# Patient Record
Sex: FEMALE | Race: WHITE | NOT HISPANIC OR LATINO
[De-identification: names, ages, dates, MRNs, and addresses within clinical notes are randomized per-mention and may not be internally consistent; named-entity substitution may affect disease eponyms.]

---

## 2019-03-22 ENCOUNTER — RESULT REVIEW (OUTPATIENT)
Age: 34
End: 2019-03-22

## 2019-03-23 ENCOUNTER — INPATIENT (INPATIENT)
Facility: HOSPITAL | Age: 34
LOS: 0 days | Discharge: HOME | DRG: 817 | End: 2019-03-24
Attending: OBSTETRICS & GYNECOLOGY | Admitting: OBSTETRICS & GYNECOLOGY
Payer: COMMERCIAL

## 2019-03-23 VITALS
HEART RATE: 108 BPM | WEIGHT: 136.03 LBS | OXYGEN SATURATION: 98 % | SYSTOLIC BLOOD PRESSURE: 138 MMHG | TEMPERATURE: 98 F | DIASTOLIC BLOOD PRESSURE: 85 MMHG | RESPIRATION RATE: 19 BRPM | HEIGHT: 63 IN

## 2019-03-23 LAB
ANION GAP SERPL CALC-SCNC: 12 MMOL/L — SIGNIFICANT CHANGE UP (ref 5–17)
APTT BLD: 33.1 SEC — SIGNIFICANT CHANGE UP (ref 27.5–36.3)
BASOPHILS # BLD AUTO: 0.02 K/UL — SIGNIFICANT CHANGE UP (ref 0–0.2)
BASOPHILS NFR BLD AUTO: 0.2 % — SIGNIFICANT CHANGE UP (ref 0–2)
BLD GP AB SCN SERPL QL: NEGATIVE — SIGNIFICANT CHANGE UP
BUN SERPL-MCNC: 10 MG/DL — SIGNIFICANT CHANGE UP (ref 7–23)
CALCIUM SERPL-MCNC: 10 MG/DL — SIGNIFICANT CHANGE UP (ref 8.4–10.5)
CHLORIDE SERPL-SCNC: 104 MMOL/L — SIGNIFICANT CHANGE UP (ref 96–108)
CO2 SERPL-SCNC: 24 MMOL/L — SIGNIFICANT CHANGE UP (ref 22–31)
CREAT SERPL-MCNC: 0.38 MG/DL — LOW (ref 0.5–1.3)
EOSINOPHIL # BLD AUTO: 0.02 K/UL — SIGNIFICANT CHANGE UP (ref 0–0.5)
EOSINOPHIL NFR BLD AUTO: 0.2 % — SIGNIFICANT CHANGE UP (ref 0–6)
GLUCOSE SERPL-MCNC: 208 MG/DL — HIGH (ref 70–99)
HCT VFR BLD CALC: 40.9 % — SIGNIFICANT CHANGE UP (ref 34.5–45)
HGB BLD-MCNC: 14.2 G/DL — SIGNIFICANT CHANGE UP (ref 11.5–15.5)
IMM GRANULOCYTES NFR BLD AUTO: 0.3 % — SIGNIFICANT CHANGE UP (ref 0–1.5)
INR BLD: 1.05 — SIGNIFICANT CHANGE UP (ref 0.88–1.16)
LYMPHOCYTES # BLD AUTO: 1.24 K/UL — SIGNIFICANT CHANGE UP (ref 1–3.3)
LYMPHOCYTES # BLD AUTO: 9.5 % — LOW (ref 13–44)
MCHC RBC-ENTMCNC: 28.1 PG — SIGNIFICANT CHANGE UP (ref 27–34)
MCHC RBC-ENTMCNC: 34.7 GM/DL — SIGNIFICANT CHANGE UP (ref 32–36)
MCV RBC AUTO: 81 FL — SIGNIFICANT CHANGE UP (ref 80–100)
MONOCYTES # BLD AUTO: 0.36 K/UL — SIGNIFICANT CHANGE UP (ref 0–0.9)
MONOCYTES NFR BLD AUTO: 2.8 % — SIGNIFICANT CHANGE UP (ref 2–14)
NEUTROPHILS # BLD AUTO: 11.38 K/UL — HIGH (ref 1.8–7.4)
NEUTROPHILS NFR BLD AUTO: 87 % — HIGH (ref 43–77)
NRBC # BLD: 0 /100 WBCS — SIGNIFICANT CHANGE UP (ref 0–0)
PLATELET # BLD AUTO: 252 K/UL — SIGNIFICANT CHANGE UP (ref 150–400)
POTASSIUM SERPL-MCNC: 3.8 MMOL/L — SIGNIFICANT CHANGE UP (ref 3.5–5.3)
POTASSIUM SERPL-SCNC: 3.8 MMOL/L — SIGNIFICANT CHANGE UP (ref 3.5–5.3)
PROTHROM AB SERPL-ACNC: 11.9 SEC — SIGNIFICANT CHANGE UP (ref 10–12.9)
RBC # BLD: 5.05 M/UL — SIGNIFICANT CHANGE UP (ref 3.8–5.2)
RBC # FLD: 12.5 % — SIGNIFICANT CHANGE UP (ref 10.3–14.5)
RH IG SCN BLD-IMP: POSITIVE — SIGNIFICANT CHANGE UP
SODIUM SERPL-SCNC: 140 MMOL/L — SIGNIFICANT CHANGE UP (ref 135–145)
WBC # BLD: 13.06 K/UL — HIGH (ref 3.8–10.5)
WBC # FLD AUTO: 13.06 K/UL — HIGH (ref 3.8–10.5)

## 2019-03-23 PROCEDURE — 99285 EMERGENCY DEPT VISIT HI MDM: CPT

## 2019-03-23 RX ORDER — BUPIVACAINE 13.3 MG/ML
20 INJECTION, SUSPENSION, LIPOSOMAL INFILTRATION ONCE
Qty: 0 | Refills: 0 | Status: DISCONTINUED | OUTPATIENT
Start: 2019-03-23 | End: 2019-03-24

## 2019-03-23 NOTE — ED PROVIDER NOTE - CLINICAL SUMMARY MEDICAL DECISION MAKING FREE TEXT BOX
Impression: known ectopic pregnancy tx'd w/ methotrexate, with r sided abd pain today, concerning for impending rupture. Afebrile. HDS. Pt seen by gyn resident and attending in ed. Pt to be taken to OR tonight.

## 2019-03-23 NOTE — ED ADULT NURSE NOTE - OTHER COMPLAINTS
CC of medical evaluation, sent by the [facility] (ob-gyne), known ectopic pregnancy R fallopian tube, ~ 6 weeks AOG, . had a methotrexate shot 2019. had a sonogram this AM and to be seen by the OB. pain is lesser compared this AM

## 2019-03-23 NOTE — H&P ADULT - HISTORY OF PRESENT ILLNESS
34 yo G1 at 6 weeks presents for evaluation of new onset abdominal pain in the setting of a known right tubal ectopic pregnancy. Patient reports that she received IM Methotrexate on 3/18 for this tubal pregnancy. Today, the patient developed abdominal pain in the afternoon that she initially attributed to gas pains but which did not resolve with rest and PO hydration. Patient called her doctor and she was told to come to the ED for further evaluation. Patient denies fever, chills, chest pain, SOB,  nausea, vomiting, and vaginal bleeding.       OBHx: G1- current; s/p Methotrexate on 3/18 for right tubal ectopic   GYN Hx: denies history of STIs, fibroids; known left dermoid cyst - approximately 3-4 cm   PMHx: Asthma - never hospitalized or intubated   SHx: mole removal   Meds: denies   Allergies: Cats; NKDA

## 2019-03-23 NOTE — H&P ADULT - ASSESSMENT
32 yo G1 at 6 weeks presents with new onset abdominal pain in the setting of a known right tubal ectopic.   - Admit for diagnostic laparoscopy   - NPO  - IV fluids  - CBC, serum bHCG and type and screen sent       Patient evaluated with Dr. Hightower

## 2019-03-23 NOTE — ED PROVIDER NOTE - PHYSICAL EXAMINATION
VITAL SIGNS: I have reviewed nursing notes and confirm.  CONSTITUTIONAL: Well-developed; well-nourished; in no acute distress.   SKIN:  warm and dry, no acute rash.   HEAD:  normocephalic, atraumatic.  EYES: PERRL, EOM intact; conjunctiva and sclera clear.  ENT: No nasal discharge; airway clear.   NECK: Supple; non tender.  CARD: S1, S2 normal; no murmurs, gallops, or rubs. Regular rate and rhythm.   RESP:  Clear to auscultation b/l, no wheezes, rales or rhonchi.  ABD: Normal bowel sounds; soft; non-distended; mild tenderness to r abd/ periumbilical region; no guarding/ rebound.  PELVIC: deferred to gyn.  EXT: Normal ROM. No clubbing, cyanosis or edema. 2+ pulses to b/l ue/le.  NEURO: Alert, oriented, grossly unremarkable  PSYCH: Cooperative, mood and affect appropriate.

## 2019-03-23 NOTE — ED PROVIDER NOTE - CARE PLAN
Principal Discharge DX:	Ectopic pregnancy, unspecified location, unspecified whether intrauterine pregnancy present  Secondary Diagnosis:	Cyst of ovary, unspecified laterality

## 2019-03-23 NOTE — ED ADULT NURSE NOTE - OBJECTIVE STATEMENT
PT came to ED for medical evaluation, sent by the [facility] (ob-[facility]), known ectopic pregnancy R fallopian tube, ~ 6 weeks AOG, . had a methotrexate shot 2019. had a sonogram this AM and to be seen by the OB. pain is lesser compared this AM.  PT reports spotting, denies chest pain, SOB, fever, chills, D/N/V. Pt is alert and oriented x3. ambulatory with even gait. PT came to ED for medical evaluation, sent by the [facility] ([facility]), known ectopic pregnancy R fallopian tube, ~ 6 weeks AOG, . had a methotrexate shot 2019. had a sonogram this AM and to be seen by the OB. pain is lesser compared this AM.  PT reports spotting and bleeding for last several weeks, reports nausea and vomiting today. Denies chest pain, SOB, fever, chills, dizziness. Pt is alert and oriented x3. ambulatory with even gait.

## 2019-03-23 NOTE — ED ADULT TRIAGE NOTE - OTHER COMPLAINTS
CC of medical evaluation, sent by the [facility] ([facility]), known ectopic pregnancy R fallopian tube, had a methotrexate shot 18 March 2019. had a sonogram this AM and to be seen by the OB. pain is lesser compared this AM CC of medical evaluation, sent by the [facility] ([facility]), known ectopic pregnancy R fallopian tube, ~ 6 weeks AOG, . had a methotrexate shot 2019. had a sonogram this AM and to be seen by the OB. pain is lesser compared this AM

## 2019-03-23 NOTE — H&P ADULT - NSHPLABSRESULTS_GEN_ALL_CORE
LABS:                        14.2   13.06 )-----------( 252      ( 23 Mar 2019 22:17 )             40.9     03-23    140  |  104  |  10  ----------------------------<  208<H>  3.8   |  24  |  0.38<L>    Ca    10.0      23 Mar 2019 22:17      PT/INR - ( 23 Mar 2019 22:17 )   PT: 11.9 sec;   INR: 1.05          PTT - ( 23 Mar 2019 22:17 )  PTT:33.1 sec

## 2019-03-24 ENCOUNTER — TRANSCRIPTION ENCOUNTER (OUTPATIENT)
Age: 34
End: 2019-03-24

## 2019-03-24 VITALS
HEART RATE: 106 BPM | DIASTOLIC BLOOD PRESSURE: 76 MMHG | RESPIRATION RATE: 16 BRPM | TEMPERATURE: 98 F | SYSTOLIC BLOOD PRESSURE: 128 MMHG | OXYGEN SATURATION: 95 %

## 2019-03-24 LAB
HCG SERPL-ACNC: 4293 MIU/ML — HIGH
HCT VFR BLD CALC: 35.7 % — SIGNIFICANT CHANGE UP (ref 34.5–45)
HGB BLD-MCNC: 12.1 G/DL — SIGNIFICANT CHANGE UP (ref 11.5–15.5)
MCHC RBC-ENTMCNC: 27.6 PG — SIGNIFICANT CHANGE UP (ref 27–34)
MCHC RBC-ENTMCNC: 33.9 GM/DL — SIGNIFICANT CHANGE UP (ref 32–36)
MCV RBC AUTO: 81.5 FL — SIGNIFICANT CHANGE UP (ref 80–100)
NRBC # BLD: 0 /100 WBCS — SIGNIFICANT CHANGE UP (ref 0–0)
PLATELET # BLD AUTO: 196 K/UL — SIGNIFICANT CHANGE UP (ref 150–400)
RBC # BLD: 4.38 M/UL — SIGNIFICANT CHANGE UP (ref 3.8–5.2)
RBC # FLD: 13 % — SIGNIFICANT CHANGE UP (ref 10.3–14.5)
WBC # BLD: 8.76 K/UL — SIGNIFICANT CHANGE UP (ref 3.8–10.5)
WBC # FLD AUTO: 8.76 K/UL — SIGNIFICANT CHANGE UP (ref 3.8–10.5)

## 2019-03-24 RX ORDER — SODIUM CHLORIDE 9 MG/ML
1000 INJECTION, SOLUTION INTRAVENOUS
Qty: 0 | Refills: 0 | Status: DISCONTINUED | OUTPATIENT
Start: 2019-03-24 | End: 2019-03-24

## 2019-03-24 RX ORDER — ALBUTEROL 90 UG/1
2 AEROSOL, METERED ORAL EVERY 6 HOURS
Qty: 0 | Refills: 0 | Status: DISCONTINUED | OUTPATIENT
Start: 2019-03-24 | End: 2019-03-24

## 2019-03-24 RX ORDER — KETOROLAC TROMETHAMINE 30 MG/ML
30 SYRINGE (ML) INJECTION EVERY 6 HOURS
Qty: 0 | Refills: 0 | Status: COMPLETED | OUTPATIENT
Start: 2019-03-24 | End: 2019-03-24

## 2019-03-24 RX ORDER — HYDROMORPHONE HYDROCHLORIDE 2 MG/ML
0.5 INJECTION INTRAMUSCULAR; INTRAVENOUS; SUBCUTANEOUS
Qty: 0 | Refills: 0 | Status: DISCONTINUED | OUTPATIENT
Start: 2019-03-24 | End: 2019-03-24

## 2019-03-24 RX ORDER — BENZOCAINE AND MENTHOL 5; 1 G/100ML; G/100ML
1 LIQUID ORAL EVERY 4 HOURS
Qty: 0 | Refills: 0 | Status: DISCONTINUED | OUTPATIENT
Start: 2019-03-24 | End: 2019-03-24

## 2019-03-24 RX ORDER — SIMETHICONE 80 MG/1
80 TABLET, CHEWABLE ORAL EVERY 8 HOURS
Qty: 0 | Refills: 0 | Status: DISCONTINUED | OUTPATIENT
Start: 2019-03-24 | End: 2019-03-24

## 2019-03-24 RX ORDER — OXYCODONE HYDROCHLORIDE 5 MG/1
5 TABLET ORAL EVERY 6 HOURS
Qty: 0 | Refills: 0 | Status: DISCONTINUED | OUTPATIENT
Start: 2019-03-24 | End: 2019-03-24

## 2019-03-24 RX ORDER — ONDANSETRON 8 MG/1
4 TABLET, FILM COATED ORAL EVERY 6 HOURS
Qty: 0 | Refills: 0 | Status: DISCONTINUED | OUTPATIENT
Start: 2019-03-24 | End: 2019-03-24

## 2019-03-24 RX ORDER — ACETAMINOPHEN 500 MG
1000 TABLET ORAL ONCE
Qty: 0 | Refills: 0 | Status: DISCONTINUED | OUTPATIENT
Start: 2019-03-24 | End: 2019-03-24

## 2019-03-24 RX ADMIN — OXYCODONE HYDROCHLORIDE 5 MILLIGRAM(S): 5 TABLET ORAL at 09:36

## 2019-03-24 RX ADMIN — SODIUM CHLORIDE 100 MILLILITER(S): 9 INJECTION, SOLUTION INTRAVENOUS at 09:09

## 2019-03-24 RX ADMIN — Medication 30 MILLIGRAM(S): at 02:42

## 2019-03-24 RX ADMIN — SIMETHICONE 80 MILLIGRAM(S): 80 TABLET, CHEWABLE ORAL at 06:00

## 2019-03-24 RX ADMIN — OXYCODONE HYDROCHLORIDE 5 MILLIGRAM(S): 5 TABLET ORAL at 10:00

## 2019-03-24 RX ADMIN — Medication 30 MILLIGRAM(S): at 07:09

## 2019-03-24 RX ADMIN — Medication 30 MILLIGRAM(S): at 06:48

## 2019-03-24 RX ADMIN — SODIUM CHLORIDE 100 MILLILITER(S): 9 INJECTION, SOLUTION INTRAVENOUS at 02:30

## 2019-03-24 RX ADMIN — BENZOCAINE AND MENTHOL 1 LOZENGE: 5; 1 LIQUID ORAL at 06:02

## 2019-03-24 NOTE — PATIENT PROFILE ADULT - REASON FOR REFUSAL (REFER PATIENT TO HEALTHCARE PROVIDER FOR FOLLOW-UP):
pt stated she's not sure how getting the vaccine will interact with one of the medications she's one

## 2019-03-24 NOTE — BRIEF OPERATIVE NOTE - NSICDXBRIEFPOSTOP_GEN_ALL_CORE_FT
POST-OP DIAGNOSIS:  Ruptured right tubal ectopic pregnancy causing hemoperitoneum 24-Mar-2019 02:08:23  Adilene Murray

## 2019-03-24 NOTE — DISCHARGE NOTE PROVIDER - NSDCCPCAREPLAN_GEN_ALL_CORE_FT
PRINCIPAL DISCHARGE DIAGNOSIS  Diagnosis: Ectopic pregnancy, unspecified location, unspecified whether intrauterine pregnancy present  Assessment and Plan of Treatment:       SECONDARY DISCHARGE DIAGNOSES  Diagnosis: Cyst of ovary, unspecified laterality  Assessment and Plan of Treatment:

## 2019-03-24 NOTE — PROGRESS NOTE ADULT - SUBJECTIVE AND OBJECTIVE BOX
GYN PROGRESS NOTE / POC     Patient evaluated at the bedside. No acute events. Patient reports that her pain is controlled but that she does have abdominal discomfort. Patient was feeling short of breath earlier so she used her inhaler with resolution of her SOB. She has not yet passed flatus but is tolerating clears. She is voiding spontaneously. Denies CP/SOB/dizziness/nausea/vomiting/abdominal pain/calf pain.     O:   T(C): 36.8 (03-24-19 @ 08:03), Max: 37.1 (03-24-19 @ 06:02)  HR: 106 (03-24-19 @ 08:03) (105 - 126)  BP: 128/76 (03-24-19 @ 08:03) (116/73 - 173/98)  RR: 16 (03-24-19 @ 08:03) (15 - 19)  SpO2: 95% (03-24-19 @ 08:03) (95% - 98%)  Wt(kg): --    GEN: patient appears well  LUNGS: no respiratory distress  ABD: soft, appropriately tender, moderate distension, no rebound or guarding  EXT: no calf tenderness        03-23 @ 07:01  -  03-24 @ 07:00  --------------------------------------------------------  IN: 600 mL / OUT: 600 mL / NET: 0 mL    03-24 @ 07:01  -  03-24 @ 09:37  --------------------------------------------------------  IN: 300 mL / OUT: 0 mL / NET: 300 mL

## 2019-03-24 NOTE — DISCHARGE NOTE PROVIDER - HOSPITAL COURSE
Patient had uncomplicated diagnostic laparoscopy for ruptured ectopic pregnancy with a right salpingectomy and left dermoid cystectomy.  Uncomplicated postoperative course and being discharged home in stable condition.

## 2019-03-24 NOTE — DISCHARGE NOTE NURSING/CASE MANAGEMENT/SOCIAL WORK - NSDCDPATPORTLINK_GEN_ALL_CORE
You can access the [email or web address] Portal, offered by [facility], by registering with the following website: [email or web address]

## 2019-03-24 NOTE — BRIEF OPERATIVE NOTE - NSICDXBRIEFPROCEDURE_GEN_ALL_CORE_FT
PROCEDURES:  Evacuation of hemoperitoneum 24-Mar-2019 02:07:39  Adilene Murray  Right salpingectomy 24-Mar-2019 02:07:24  Adilene Murray  Laparoscopic left ovarian cystectomy 24-Mar-2019 02:06:58  Adilene Murray  Diagnostic laparoscopy 24-Mar-2019 02:06:44 For Dermoid cyst Adilene Murray

## 2019-03-24 NOTE — DISCHARGE NOTE PROVIDER - CARE PROVIDER_API CALL
Pamela Zuniga)  Obstetrics and Gynecology  [address]  [place] [address]  Phone: [phone]  Fax: [phone]  Follow Up Time:     Kelvin Hightower)  Obstetrics and Gynecology  [address] [place] [id number]  Phone: [phone]  Fax: [phone]  Follow Up Time:

## 2019-03-24 NOTE — BRIEF OPERATIVE NOTE - OPERATION/FINDINGS
Hemoperitoneum noted upon entry, significant edematous and massively dilated right fallopian tube encased in blood clot and actively bleeding, normal right ovary, normal left fallopian tube, enlarged left ovary with cystic component, normal appearing uterus, no liver adhesions, omental adhesions to left pelvic sidewall

## 2019-03-24 NOTE — PROGRESS NOTE ADULT - ASSESSMENT
Assessment and Plan: 34 yo G1 s/p diagnostic laparoscopy with right salpingectomy, evacuation of hemoperitoneum for ruptured right tubal ectopic pregnancy. Patient is also status post left dermoid ovarian cystectomy. - POD0.     1. ID: S/p Ancef 2g in OR; no acute issues.   2. FEN/GI - Regular diet as tolerated.   3. PAIN - Controlled with oral pain medications  4.  - Voiding spontaneously.    5. RESP -  No acute issues.   6. VTE prophylaxis - SCDs, ambulate as tolerated.   7. LABS -  AM labs pending.   8. DISPO -  Later today once all postoperative milestones have been met.

## 2019-03-24 NOTE — DISCHARGE NOTE PROVIDER - NSDCACTIVITY_GEN_ALL_CORE
Return to Work/School allowed/Walking - Outdoors allowed/Stairs allowed/Walking - Indoors allowed/Showering allowed

## 2019-03-26 DIAGNOSIS — O34.81 MATERNAL CARE FOR OTHER ABNORMALITIES OF PELVIC ORGANS, FIRST TRIMESTER: ICD-10-CM

## 2019-03-26 DIAGNOSIS — K66.1 HEMOPERITONEUM: ICD-10-CM

## 2019-03-26 DIAGNOSIS — O99.89 OTHER SPECIFIED DISEASES AND CONDITIONS COMPLICATING PREGNANCY, CHILDBIRTH AND THE PUERPERIUM: ICD-10-CM

## 2019-03-26 DIAGNOSIS — O00.102 LEFT TUBAL PREGNANCY WITHOUT INTRAUTERINE PREGNANCY: ICD-10-CM

## 2019-03-26 DIAGNOSIS — Z3A.01 LESS THAN 8 WEEKS GESTATION OF PREGNANCY: ICD-10-CM

## 2019-03-26 DIAGNOSIS — D27.1 BENIGN NEOPLASM OF LEFT OVARY: ICD-10-CM

## 2019-03-26 PROCEDURE — 80048 BASIC METABOLIC PNL TOTAL CA: CPT

## 2019-03-26 PROCEDURE — 88304 TISSUE EXAM BY PATHOLOGIST: CPT

## 2019-03-26 PROCEDURE — 86901 BLOOD TYPING SEROLOGIC RH(D): CPT

## 2019-03-26 PROCEDURE — 36415 COLL VENOUS BLD VENIPUNCTURE: CPT

## 2019-03-26 PROCEDURE — 86850 RBC ANTIBODY SCREEN: CPT

## 2019-03-26 PROCEDURE — 85610 PROTHROMBIN TIME: CPT

## 2019-03-26 PROCEDURE — 85027 COMPLETE CBC AUTOMATED: CPT

## 2019-03-26 PROCEDURE — 84702 CHORIONIC GONADOTROPIN TEST: CPT

## 2019-03-26 PROCEDURE — 88305 TISSUE EXAM BY PATHOLOGIST: CPT

## 2019-03-26 PROCEDURE — 86900 BLOOD TYPING SEROLOGIC ABO: CPT

## 2019-03-26 PROCEDURE — 85730 THROMBOPLASTIN TIME PARTIAL: CPT

## 2019-03-26 PROCEDURE — 99285 EMERGENCY DEPT VISIT HI MDM: CPT

## 2019-03-26 PROCEDURE — C1889: CPT

## 2019-03-26 PROCEDURE — 85025 COMPLETE CBC W/AUTO DIFF WBC: CPT

## 2019-03-28 LAB — SURGICAL PATHOLOGY STUDY: SIGNIFICANT CHANGE UP

## 2022-11-29 PROBLEM — J45.909 UNSPECIFIED ASTHMA, UNCOMPLICATED: Chronic | Status: ACTIVE | Noted: 2019-03-23

## 2022-11-29 PROBLEM — N83.209 UNSPECIFIED OVARIAN CYST, UNSPECIFIED SIDE: Chronic | Status: ACTIVE | Noted: 2019-03-23

## 2022-12-02 ENCOUNTER — RESULT REVIEW (OUTPATIENT)
Age: 37
End: 2022-12-02

## 2022-12-02 ENCOUNTER — APPOINTMENT (OUTPATIENT)
Dept: HUMAN REPRODUCTION | Facility: CLINIC | Age: 37
End: 2022-12-02

## 2022-12-02 PROCEDURE — 99205 OFFICE O/P NEW HI 60 MIN: CPT | Mod: NC

## 2022-12-02 PROCEDURE — 99205P: CUSTOM

## 2022-12-02 PROCEDURE — 76830 TRANSVAGINAL US NON-OB: CPT | Mod: NC

## 2022-12-02 PROCEDURE — 36415 COLL VENOUS BLD VENIPUNCTURE: CPT | Mod: NC

## 2022-12-06 ENCOUNTER — APPOINTMENT (OUTPATIENT)
Dept: HUMAN REPRODUCTION | Facility: CLINIC | Age: 37
End: 2022-12-06

## 2022-12-06 PROCEDURE — 99213 OFFICE O/P EST LOW 20 MIN: CPT | Mod: NC

## 2022-12-06 PROCEDURE — 36415 COLL VENOUS BLD VENIPUNCTURE: CPT | Mod: NC

## 2022-12-06 PROCEDURE — 76857 US EXAM PELVIC LIMITED: CPT | Mod: NC

## 2022-12-08 ENCOUNTER — APPOINTMENT (OUTPATIENT)
Dept: HUMAN REPRODUCTION | Facility: CLINIC | Age: 37
End: 2022-12-08

## 2022-12-08 ENCOUNTER — TRANSCRIPTION ENCOUNTER (OUTPATIENT)
Age: 37
End: 2022-12-08

## 2022-12-08 PROCEDURE — 99213 OFFICE O/P EST LOW 20 MIN: CPT | Mod: NC

## 2022-12-08 PROCEDURE — 76857 US EXAM PELVIC LIMITED: CPT | Mod: NC

## 2022-12-08 PROCEDURE — 36415 COLL VENOUS BLD VENIPUNCTURE: CPT | Mod: NC

## 2022-12-11 ENCOUNTER — APPOINTMENT (OUTPATIENT)
Dept: HUMAN REPRODUCTION | Facility: CLINIC | Age: 37
End: 2022-12-11

## 2022-12-11 PROCEDURE — 36415 COLL VENOUS BLD VENIPUNCTURE: CPT | Mod: NC

## 2022-12-11 PROCEDURE — 76857 US EXAM PELVIC LIMITED: CPT | Mod: NC

## 2022-12-11 PROCEDURE — 99213 OFFICE O/P EST LOW 20 MIN: CPT | Mod: NC

## 2022-12-12 ENCOUNTER — APPOINTMENT (OUTPATIENT)
Dept: HUMAN REPRODUCTION | Facility: CLINIC | Age: 37
End: 2022-12-12

## 2022-12-13 ENCOUNTER — APPOINTMENT (OUTPATIENT)
Dept: HUMAN REPRODUCTION | Facility: CLINIC | Age: 37
End: 2022-12-13

## 2022-12-13 PROCEDURE — 36415 COLL VENOUS BLD VENIPUNCTURE: CPT | Mod: NC

## 2022-12-13 PROCEDURE — 99213 OFFICE O/P EST LOW 20 MIN: CPT | Mod: NC

## 2022-12-13 PROCEDURE — 76857 US EXAM PELVIC LIMITED: CPT | Mod: NC

## 2022-12-14 ENCOUNTER — APPOINTMENT (OUTPATIENT)
Dept: HUMAN REPRODUCTION | Facility: CLINIC | Age: 37
End: 2022-12-14

## 2022-12-14 PROCEDURE — 36415 COLL VENOUS BLD VENIPUNCTURE: CPT | Mod: NC

## 2022-12-14 PROCEDURE — 76857 US EXAM PELVIC LIMITED: CPT | Mod: NC

## 2022-12-15 ENCOUNTER — APPOINTMENT (OUTPATIENT)
Dept: HUMAN REPRODUCTION | Facility: CLINIC | Age: 37
End: 2022-12-15

## 2022-12-15 PROCEDURE — 36415 COLL VENOUS BLD VENIPUNCTURE: CPT | Mod: NC

## 2022-12-15 PROCEDURE — 99213 OFFICE O/P EST LOW 20 MIN: CPT | Mod: NC

## 2022-12-15 PROCEDURE — 76857 US EXAM PELVIC LIMITED: CPT | Mod: NC

## 2022-12-16 ENCOUNTER — APPOINTMENT (OUTPATIENT)
Dept: HUMAN REPRODUCTION | Facility: CLINIC | Age: 37
End: 2022-12-16

## 2022-12-16 PROCEDURE — 99213 OFFICE O/P EST LOW 20 MIN: CPT | Mod: NC

## 2022-12-16 PROCEDURE — 36415 COLL VENOUS BLD VENIPUNCTURE: CPT | Mod: NC

## 2022-12-16 PROCEDURE — 76830 TRANSVAGINAL US NON-OB: CPT | Mod: NC

## 2022-12-18 ENCOUNTER — APPOINTMENT (OUTPATIENT)
Dept: HUMAN REPRODUCTION | Facility: CLINIC | Age: 37
End: 2022-12-18

## 2022-12-18 PROCEDURE — S4016P: CUSTOM

## 2022-12-18 PROCEDURE — 89281P: CUSTOM

## 2023-05-01 NOTE — PATIENT PROFILE ADULT - PRO INTERPRETER NEED 2
English [FreeTextEntry1] : Patient with history of chronic constipation here for follow-up visit.  She called last week stating constipation was getting worse and not responding to OTC laxatives.  Only responded to herbal senna tea which she had to drink.  Amitiza had worked very well but she did not realize she had prescription refills and was to have stayed on it indefinitely.  Prescription was renewed 3 days ago and seems to be helping.  Along with worsening constipation patient started to experience heartburn and epigastric/chest discomfort associated with frequent vomiting.  Partial relief with OTC Prilosec.  No prior history of GERD.  No dysphagia.

## 2024-03-04 NOTE — ED ADULT TRIAGE NOTE - BMI (KG/M2)
Recently d/c from  [facility] inpatient with swelling and some SOB  She had several med changes and reports edema has improved  Reports appt with with cardiology- Dr Robledo next week    
Reports appt with Dr Blount next month  She has seen him in the past and was referred back to him   
24.1

## 2025-01-29 NOTE — ED ADULT TRIAGE NOTE - PAIN RATING/NUMBER SCALE (0-10): ACTIVITY
Vital Signs Last 24 Hrs  T(C): 36.5 (29 Jan 2025 10:00), Max: 36.9 (29 Jan 2025 05:35)  T(F): 97.7 (29 Jan 2025 10:00), Max: 98.5 (29 Jan 2025 05:35)  HR: 88 (29 Jan 2025 10:00) (75 - 88)  BP: 137/83 (29 Jan 2025 10:00) (114/69 - 141/70)  BP(mean): --  RR: 19 (29 Jan 2025 10:00) (17 - 19)  SpO2: 95% (29 Jan 2025 10:00) (95% - 98%)    Parameters below as of 29 Jan 2025 10:00  Patient On (Oxygen Delivery Method): room air    
3

## 2025-04-04 ENCOUNTER — APPOINTMENT (OUTPATIENT)
Dept: ANTEPARTUM | Facility: CLINIC | Age: 40
End: 2025-04-04

## 2025-04-04 ENCOUNTER — ASOB RESULT (OUTPATIENT)
Age: 40
End: 2025-04-04

## 2025-04-04 PROCEDURE — 76813 OB US NUCHAL MEAS 1 GEST: CPT

## 2025-04-04 PROCEDURE — 76815 OB US LIMITED FETUS(S): CPT

## 2025-04-04 PROCEDURE — 93976 VASCULAR STUDY: CPT
